# Patient Record
Sex: MALE | URBAN - NONMETROPOLITAN AREA
[De-identification: names, ages, dates, MRNs, and addresses within clinical notes are randomized per-mention and may not be internally consistent; named-entity substitution may affect disease eponyms.]

---

## 2024-10-07 ENCOUNTER — APPOINTMENT (RX ONLY)
Dept: URBAN - NONMETROPOLITAN AREA CLINIC 37 | Facility: CLINIC | Age: 54
Setting detail: DERMATOLOGY
End: 2024-10-07

## 2024-10-07 DIAGNOSIS — L23.9 ALLERGIC CONTACT DERMATITIS, UNSPECIFIED CAUSE: ICD-10-CM

## 2024-10-07 PROCEDURE — ? COUNSELING

## 2024-10-07 PROCEDURE — 99203 OFFICE O/P NEW LOW 30 MIN: CPT

## 2024-10-07 PROCEDURE — ? ADDITIONAL NOTES

## 2024-10-07 PROCEDURE — ? TREATMENT REGIMEN

## 2024-10-07 PROCEDURE — ? PRESCRIPTION

## 2024-10-07 RX ORDER — TRIAMCINOLONE ACETONIDE 1 MG/G
CREAM TOPICAL BID
Qty: 454 | Refills: 1 | Status: ERX | COMMUNITY
Start: 2024-10-07

## 2024-10-07 RX ADMIN — TRIAMCINOLONE ACETONIDE: 1 CREAM TOPICAL at 00:00

## 2024-10-07 ASSESSMENT — LOCATION ZONE DERM
LOCATION ZONE: LEG
LOCATION ZONE: ARM

## 2024-10-07 ASSESSMENT — LOCATION DETAILED DESCRIPTION DERM
LOCATION DETAILED: LEFT DISTAL DORSAL FOREARM
LOCATION DETAILED: LEFT ANTERIOR LATERAL PROXIMAL THIGH

## 2024-10-07 ASSESSMENT — LOCATION SIMPLE DESCRIPTION DERM
LOCATION SIMPLE: LEFT FOREARM
LOCATION SIMPLE: LEFT THIGH

## 2024-10-07 NOTE — PROCEDURE: ADDITIONAL NOTES
Render Risk Assessment In Note?: no
Detail Level: Simple
Additional Notes: Pt will RTC when flared to Bx. Discussed patch testing. Pt says most improved with moisturizer and is not flared today.

## 2024-10-07 NOTE — PROCEDURE: TREATMENT REGIMEN
Plan: Pt will RTC if rash flares for further evaluation. Discussed Bx and Patch Testing.
Initiate Treatment: Triamcinalone 0.1% topical cream - When flared, patient will apply to the affected areas twice a day for two weeks.
Detail Level: Zone
Continue Regimen: Cetaphil oil-free Hydrating Lotion

## 2024-10-07 NOTE — HPI: RASH
How Severe Is Your Rash?: moderate
Is This A New Presentation, Or A Follow-Up?: Rash
Additional History: The patient has experienced a recurrent rash for several years. However, the rash has been persistent for the past year. He has previously used Clobetasol and castor oil and is currently using Cetaphil Oil-Free Hydrating Lotion. His prior dermatologist suspected an allergy, and blood work indicated sensitivity only to [place]. The patient reports that the rash is currently the best it has been in a year.

## 2024-12-10 ENCOUNTER — APPOINTMENT (RX ONLY)
Dept: URBAN - NONMETROPOLITAN AREA CLINIC 37 | Facility: CLINIC | Age: 54
Setting detail: DERMATOLOGY
End: 2024-12-10

## 2024-12-10 DIAGNOSIS — L259 CONTACT DERMATITIS AND OTHER ECZEMA, UNSPECIFIED CAUSE: ICD-10-CM

## 2024-12-10 DIAGNOSIS — L57.8 OTHER SKIN CHANGES DUE TO CHRONIC EXPOSURE TO NONIONIZING RADIATION: ICD-10-CM

## 2024-12-10 DIAGNOSIS — D22 MELANOCYTIC NEVI: ICD-10-CM

## 2024-12-10 PROBLEM — D22.5 MELANOCYTIC NEVI OF TRUNK: Status: ACTIVE | Noted: 2024-12-10

## 2024-12-10 PROBLEM — L30.9 DERMATITIS, UNSPECIFIED: Status: ACTIVE | Noted: 2024-12-10

## 2024-12-10 PROCEDURE — 99213 OFFICE O/P EST LOW 20 MIN: CPT | Mod: 25

## 2024-12-10 PROCEDURE — ? TREATMENT REGIMEN

## 2024-12-10 PROCEDURE — ? KOH PREP

## 2024-12-10 PROCEDURE — ? COUNSELING

## 2024-12-10 PROCEDURE — ? SUNSCREEN TREATMENT REGIMEN

## 2024-12-10 PROCEDURE — 11102 TANGNTL BX SKIN SINGLE LES: CPT

## 2024-12-10 PROCEDURE — ? PRESCRIPTION

## 2024-12-10 PROCEDURE — ? BIOPSY BY SHAVE METHOD

## 2024-12-10 RX ORDER — ECONAZOLE NITRATE 10 MG/G
CREAM TOPICAL
Qty: 85 | Refills: 0 | Status: ERX | COMMUNITY
Start: 2024-12-10

## 2024-12-10 RX ADMIN — ECONAZOLE NITRATE: 10 CREAM TOPICAL at 00:00

## 2024-12-10 ASSESSMENT — LOCATION DETAILED DESCRIPTION DERM
LOCATION DETAILED: LEFT PROXIMAL PRETIBIAL REGION
LOCATION DETAILED: LEFT DISTAL DORSAL FOREARM
LOCATION DETAILED: LEFT MEDIAL INFERIOR CHEST
LOCATION DETAILED: RIGHT SUPERIOR MEDIAL LOWER BACK
LOCATION DETAILED: LEFT ANTERIOR LATERAL PROXIMAL THIGH

## 2024-12-10 ASSESSMENT — LOCATION ZONE DERM
LOCATION ZONE: ARM
LOCATION ZONE: TRUNK
LOCATION ZONE: LEG

## 2024-12-10 ASSESSMENT — LOCATION SIMPLE DESCRIPTION DERM
LOCATION SIMPLE: LEFT FOREARM
LOCATION SIMPLE: LEFT THIGH
LOCATION SIMPLE: LEFT PRETIBIAL REGION
LOCATION SIMPLE: CHEST
LOCATION SIMPLE: RIGHT LOWER BACK

## 2024-12-10 NOTE — PROCEDURE: BIOPSY BY SHAVE METHOD
Detail Level: Detailed
Depth Of Biopsy: dermis
Was A Bandage Applied: Yes
Size Of Lesion In Cm: 0
Biopsy Type: H and E
Biopsy Method: Dermablade
Anesthesia Type: 1% lidocaine with epinephrine
Anesthesia Volume In Cc: 0.5
Hemostasis: Drysol
Wound Care: Petrolatum
Dressing: bandage
Destruction After The Procedure: No
Type Of Destruction Used: Curettage
Curettage Text: The wound bed was treated with curettage after the biopsy was performed.
Cryotherapy Text: The wound bed was treated with cryotherapy after the biopsy was performed.
Electrodesiccation Text: The wound bed was treated with electrodesiccation after the biopsy was performed.
Electrodesiccation And Curettage Text: The wound bed was treated with electrodesiccation and curettage after the biopsy was performed.
Silver Nitrate Text: The wound bed was treated with silver nitrate after the biopsy was performed.
Lab: [id number]
Lab Facility: [id number]
Medical Necessity Information: It is in your best interest to select a reason for this procedure from the list below. All of these items fulfill various CMS LCD requirements except the new and changing color options.
Consent: Written consent was obtained and risks were reviewed including but not limited to scarring, infection, bleeding, scabbing, incomplete removal, nerve damage and allergy to anesthesia.
Post-Care Instructions: I reviewed with the patient in detail post-care instructions. Patient is to keep the biopsy site dry overnight, and then apply bacitracin twice daily until healed. Patient may apply hydrogen peroxide soaks to remove any crusting.
Notification Instructions: Patient will be notified of biopsy results. However, patient instructed to call the office if not contacted within 2 weeks.
Billing Type: Third-Party Bill
Information: Selecting Yes will display possible errors in your note based on the variables you have selected. This validation is only offered as a suggestion for you. PLEASE NOTE THAT THE VALIDATION TEXT WILL BE REMOVED WHEN YOU FINALIZE YOUR NOTE. IF YOU WANT TO FAX A PRELIMINARY NOTE YOU WILL NEED TO TOGGLE THIS TO 'NO' IF YOU DO NOT WANT IT IN YOUR FAXED NOTE.

## 2024-12-16 ENCOUNTER — RX ONLY (RX ONLY)
Age: 54
End: 2024-12-16

## 2024-12-16 RX ORDER — CLOBETASOL PROPIONATE 0.5 MG/G
CREAM TOPICAL
Qty: 60 | Refills: 1 | Status: ERX | COMMUNITY
Start: 2024-12-16

## 2024-12-27 ENCOUNTER — RX ONLY (RX ONLY)
Age: 54
End: 2024-12-27

## 2024-12-27 RX ORDER — CLOBETASOL PROPIONATE 0.5 MG/G
CREAM TOPICAL
Qty: 60 | Refills: 1 | Status: ERX

## 2024-12-30 ENCOUNTER — RX ONLY (RX ONLY)
Age: 54
End: 2024-12-30

## 2024-12-30 RX ORDER — CLOBETASOL PROPIONATE 0.5 MG/G
CREAM TOPICAL
Qty: 60 | Refills: 1 | Status: CANCELLED

## 2025-01-09 ENCOUNTER — APPOINTMENT (OUTPATIENT)
Dept: URBAN - NONMETROPOLITAN AREA CLINIC 37 | Facility: CLINIC | Age: 55
Setting detail: DERMATOLOGY
End: 2025-01-09

## 2025-01-09 DIAGNOSIS — L259 CONTACT DERMATITIS AND OTHER ECZEMA, UNSPECIFIED CAUSE: ICD-10-CM | Status: IMPROVED

## 2025-01-09 PROBLEM — L23.9 ALLERGIC CONTACT DERMATITIS, UNSPECIFIED CAUSE: Status: ACTIVE | Noted: 2025-01-09

## 2025-01-09 PROCEDURE — ? PRESCRIPTION

## 2025-01-09 PROCEDURE — ? TREATMENT REGIMEN

## 2025-01-09 PROCEDURE — 99213 OFFICE O/P EST LOW 20 MIN: CPT

## 2025-01-09 PROCEDURE — ? COUNSELING

## 2025-01-09 RX ORDER — TRIAMCINOLONE ACETONIDE 1 MG/G
CREAM TOPICAL
Qty: 453.6 | Refills: 3 | Status: ERX | COMMUNITY
Start: 2025-01-09

## 2025-01-09 RX ADMIN — TRIAMCINOLONE ACETONIDE: 1 CREAM TOPICAL at 00:00

## 2025-01-09 ASSESSMENT — ITCH NUMERIC RATING SCALE: HOW SEVERE IS YOUR ITCHING?: 0

## 2025-01-09 ASSESSMENT — BSA RASH: BSA RASH: 0

## 2025-01-09 ASSESSMENT — SEVERITY ASSESSMENT: SEVERITY: ALMOST CLEAR

## 2025-01-09 ASSESSMENT — PAIN INTENSITY VAS: HOW INTENSE IS YOUR PAIN 0 BEING NO PAIN, 10 BEING THE MOST SEVERE PAIN POSSIBLE?: NO PAIN

## 2025-01-09 NOTE — PROCEDURE: TREATMENT REGIMEN
Continue Regimen: When flared, apply to affected areas twice daily for up to 2 weeks, followed by a 1 week break
Detail Level: Zone